# Patient Record
Sex: FEMALE
[De-identification: names, ages, dates, MRNs, and addresses within clinical notes are randomized per-mention and may not be internally consistent; named-entity substitution may affect disease eponyms.]

---

## 2023-04-05 PROBLEM — Z00.00 ENCOUNTER FOR PREVENTIVE HEALTH EXAMINATION: Status: ACTIVE | Noted: 2023-04-05

## 2023-07-28 ENCOUNTER — APPOINTMENT (OUTPATIENT)
Dept: OBGYN | Facility: CLINIC | Age: 73
End: 2023-07-28

## 2024-08-13 ENCOUNTER — APPOINTMENT (OUTPATIENT)
Dept: OBGYN | Facility: CLINIC | Age: 74
End: 2024-08-13
Payer: MEDICAID

## 2024-08-13 VITALS
SYSTOLIC BLOOD PRESSURE: 130 MMHG | DIASTOLIC BLOOD PRESSURE: 79 MMHG | BODY MASS INDEX: 21.69 KG/M2 | WEIGHT: 135 LBS | HEIGHT: 66 IN | HEART RATE: 63 BPM

## 2024-08-13 DIAGNOSIS — Z78.9 OTHER SPECIFIED HEALTH STATUS: ICD-10-CM

## 2024-08-13 DIAGNOSIS — Z85.43 PERSONAL HISTORY OF MALIGNANT NEOPLASM OF OVARY: ICD-10-CM

## 2024-08-13 DIAGNOSIS — Z82.62 FAMILY HISTORY OF OSTEOPOROSIS: ICD-10-CM

## 2024-08-13 DIAGNOSIS — N81.10 CYSTOCELE, UNSPECIFIED: ICD-10-CM

## 2024-08-13 PROCEDURE — 99203 OFFICE O/P NEW LOW 30 MIN: CPT

## 2024-08-13 NOTE — HISTORY OF PRESENT ILLNESS
[Y] : Positive pregnancy history [TextBox_4] : Status post DAMION/BSO for ovarian cancer stage III [PGHxTotal] : 2 [facility] [[facility]xLiving] : 2 [FreeTextEntry1] :

## 2024-08-13 NOTE — DISCUSSION/SUMMARY
[FreeTextEntry1] : 73-year-old P2 status post DAMION/BSO for stage III ovarian cancer on chemotherapy with bothersome cystocele Urogyn consult advised We discussed anterior repair versus pessary for control of symptoms Follow-up with Gal for ovarian cancer

## 2024-08-13 NOTE — PHYSICAL EXAM
[Appropriately responsive] : appropriately responsive [Alert] : alert [No Acute Distress] : no acute distress [Soft] : soft [Non-tender] : non-tender [Non-distended] : non-distended [No Lesions] : no lesions [Labia Majora] : normal [Labia Minora] : normal [Normal] : normal [Cystocele] : a cystocele [Absent] : absent [Uterine Adnexae] : absent [FreeTextEntry3] : Protruding slightly beyond the labia minora [FreeTextEntry4] : Second-degree cystocele

## 2024-10-21 ENCOUNTER — APPOINTMENT (OUTPATIENT)
Dept: UROGYNECOLOGY | Facility: CLINIC | Age: 74
End: 2024-10-21